# Patient Record
Sex: FEMALE | Race: WHITE | NOT HISPANIC OR LATINO | Employment: UNEMPLOYED | URBAN - METROPOLITAN AREA
[De-identification: names, ages, dates, MRNs, and addresses within clinical notes are randomized per-mention and may not be internally consistent; named-entity substitution may affect disease eponyms.]

---

## 2017-03-08 PROBLEM — L03.115 CELLULITIS OF RIGHT LOWER EXTREMITY: Status: ACTIVE | Noted: 2017-03-08

## 2017-03-08 PROBLEM — N12 PYELONEPHRITIS: Status: ACTIVE | Noted: 2017-03-08

## 2017-03-08 PROBLEM — R00.0 TACHYCARDIA: Status: ACTIVE | Noted: 2017-03-08

## 2017-03-08 PROBLEM — R50.9 FEVER: Status: ACTIVE | Noted: 2017-03-08

## 2017-03-22 PROBLEM — N12 PYELONEPHRITIS: Status: RESOLVED | Noted: 2017-03-08 | Resolved: 2017-03-22

## 2017-03-22 PROBLEM — R00.0 TACHYCARDIA: Status: RESOLVED | Noted: 2017-03-08 | Resolved: 2017-03-22

## 2017-03-22 PROBLEM — R00.0 TACHYCARDIA: Status: ACTIVE | Noted: 2017-03-22

## 2017-03-22 PROBLEM — R50.9 FEVER: Status: RESOLVED | Noted: 2017-03-08 | Resolved: 2017-03-22

## 2017-03-22 PROBLEM — L03.115 CELLULITIS OF RIGHT LOWER EXTREMITY: Status: RESOLVED | Noted: 2017-03-08 | Resolved: 2017-03-22

## 2017-03-22 PROBLEM — G43.909 MIGRAINE WITHOUT STATUS MIGRAINOSUS, NOT INTRACTABLE: Status: ACTIVE | Noted: 2017-03-22

## 2018-06-22 PROBLEM — Z34.90 PREGNANCY: Status: ACTIVE | Noted: 2018-06-22

## 2018-06-23 PROBLEM — O47.9 IRREGULAR UTERINE CONTRACTIONS: Status: ACTIVE | Noted: 2018-06-23

## 2018-06-28 PROBLEM — O47.9 UTERINE CONTRACTIONS DURING PREGNANCY: Status: ACTIVE | Noted: 2018-06-28

## 2018-07-09 PROBLEM — Z37.9 NORMAL LABOR: Status: ACTIVE | Noted: 2018-07-09

## 2018-10-15 PROBLEM — Z37.9 NORMAL LABOR: Status: RESOLVED | Noted: 2018-07-09 | Resolved: 2018-10-15

## 2019-03-12 PROBLEM — K29.00 ACUTE GASTRITIS WITHOUT HEMORRHAGE: Status: ACTIVE | Noted: 2019-03-12

## 2019-03-12 PROBLEM — R11.0 NAUSEA WITHOUT VOMITING: Status: ACTIVE | Noted: 2019-03-12

## 2019-03-12 PROBLEM — K21.9 GASTROESOPHAGEAL REFLUX DISEASE: Status: ACTIVE | Noted: 2019-03-12

## 2021-07-01 ENCOUNTER — PATIENT MESSAGE (OUTPATIENT)
Dept: ADMINISTRATIVE | Facility: OTHER | Age: 31
End: 2021-07-01

## 2021-07-22 ENCOUNTER — OFFICE VISIT (OUTPATIENT)
Dept: ORTHOPEDICS | Facility: CLINIC | Age: 31
End: 2021-07-22
Payer: MEDICAID

## 2021-07-22 VITALS
DIASTOLIC BLOOD PRESSURE: 80 MMHG | HEIGHT: 62 IN | SYSTOLIC BLOOD PRESSURE: 124 MMHG | BODY MASS INDEX: 39.68 KG/M2 | WEIGHT: 215.63 LBS | RESPIRATION RATE: 16 BRPM | HEART RATE: 127 BPM | OXYGEN SATURATION: 98 %

## 2021-07-22 DIAGNOSIS — G56.23 CUBITAL TUNNEL SYNDROME, BILATERAL: ICD-10-CM

## 2021-07-22 DIAGNOSIS — G89.29 CHRONIC PAIN OF LEFT KNEE: Primary | ICD-10-CM

## 2021-07-22 DIAGNOSIS — M25.522 BILATERAL ELBOW JOINT PAIN: ICD-10-CM

## 2021-07-22 DIAGNOSIS — M54.12 CERVICAL RADICULOPATHY: ICD-10-CM

## 2021-07-22 DIAGNOSIS — M25.521 BILATERAL ELBOW JOINT PAIN: ICD-10-CM

## 2021-07-22 DIAGNOSIS — M25.562 CHRONIC PAIN OF LEFT KNEE: Primary | ICD-10-CM

## 2021-07-22 PROCEDURE — 99999 PR PBB SHADOW E&M-EST. PATIENT-LVL V: ICD-10-PCS | Mod: PBBFAC,,, | Performed by: PHYSICIAN ASSISTANT

## 2021-07-22 PROCEDURE — 99215 OFFICE O/P EST HI 40 MIN: CPT | Mod: PBBFAC | Performed by: PHYSICIAN ASSISTANT

## 2021-07-22 PROCEDURE — 99999 PR PBB SHADOW E&M-EST. PATIENT-LVL V: CPT | Mod: PBBFAC,,, | Performed by: PHYSICIAN ASSISTANT

## 2021-07-22 PROCEDURE — 99204 PR OFFICE/OUTPT VISIT, NEW, LEVL IV, 45-59 MIN: ICD-10-PCS | Mod: S$PBB,,, | Performed by: PHYSICIAN ASSISTANT

## 2021-07-22 PROCEDURE — 99204 OFFICE O/P NEW MOD 45 MIN: CPT | Mod: S$PBB,,, | Performed by: PHYSICIAN ASSISTANT

## 2021-07-22 RX ORDER — FAMOTIDINE 40 MG/1
40 TABLET, FILM COATED ORAL DAILY
COMMUNITY
Start: 2021-06-22 | End: 2021-10-04

## 2021-07-22 RX ORDER — MELOXICAM 15 MG/1
15 TABLET ORAL DAILY PRN
Status: ON HOLD | COMMUNITY
Start: 2021-07-13 | End: 2023-08-17 | Stop reason: HOSPADM

## 2021-07-22 RX ORDER — NORETHINDRONE ACETATE AND ETHINYL ESTRADIOL AND FERROUS FUMARATE 1MG-20(21)
1 KIT ORAL DAILY
Status: ON HOLD | COMMUNITY
Start: 2021-07-05 | End: 2023-08-17 | Stop reason: HOSPADM

## 2021-07-22 RX ORDER — CYCLOBENZAPRINE HCL 5 MG
TABLET ORAL
Status: ON HOLD | COMMUNITY
Start: 2021-07-12 | End: 2023-08-17 | Stop reason: HOSPADM

## 2021-07-22 RX ORDER — ALBUTEROL SULFATE 90 UG/1
AEROSOL, METERED RESPIRATORY (INHALATION)
COMMUNITY
Start: 2021-07-06

## 2021-07-22 RX ORDER — HYDROXYZINE HYDROCHLORIDE 25 MG/1
25 TABLET, FILM COATED ORAL EVERY 6 HOURS PRN
COMMUNITY
Start: 2021-06-18 | End: 2021-10-04

## 2021-10-04 ENCOUNTER — OFFICE VISIT (OUTPATIENT)
Dept: ORTHOPEDICS | Facility: CLINIC | Age: 31
End: 2021-10-04
Payer: MEDICAID

## 2021-10-04 VITALS
WEIGHT: 220.88 LBS | RESPIRATION RATE: 20 BRPM | DIASTOLIC BLOOD PRESSURE: 82 MMHG | BODY MASS INDEX: 40.65 KG/M2 | OXYGEN SATURATION: 98 % | HEIGHT: 62 IN | SYSTOLIC BLOOD PRESSURE: 130 MMHG | HEART RATE: 98 BPM

## 2021-10-04 DIAGNOSIS — M25.569 KNEE PAIN, UNSPECIFIED CHRONICITY, UNSPECIFIED LATERALITY: ICD-10-CM

## 2021-10-04 PROCEDURE — 99999 PR PBB SHADOW E&M-EST. PATIENT-LVL IV: CPT | Mod: PBBFAC,,, | Performed by: PHYSICIAN ASSISTANT

## 2021-10-04 PROCEDURE — 99999 PR PBB SHADOW E&M-EST. PATIENT-LVL IV: ICD-10-PCS | Mod: PBBFAC,,, | Performed by: PHYSICIAN ASSISTANT

## 2021-10-04 PROCEDURE — 99213 PR OFFICE/OUTPT VISIT, EST, LEVL III, 20-29 MIN: ICD-10-PCS | Mod: S$PBB,,, | Performed by: PHYSICIAN ASSISTANT

## 2021-10-04 PROCEDURE — 99214 OFFICE O/P EST MOD 30 MIN: CPT | Mod: PBBFAC | Performed by: PHYSICIAN ASSISTANT

## 2021-10-04 PROCEDURE — 99213 OFFICE O/P EST LOW 20 MIN: CPT | Mod: S$PBB,,, | Performed by: PHYSICIAN ASSISTANT

## 2021-10-04 RX ORDER — LORATADINE 10 MG/1
10 TABLET ORAL DAILY
COMMUNITY
Start: 2021-09-27

## 2021-10-04 RX ORDER — DOXYCYCLINE 100 MG/1
CAPSULE ORAL
Status: ON HOLD | COMMUNITY
Start: 2021-07-16 | End: 2023-08-17 | Stop reason: HOSPADM

## 2021-10-04 RX ORDER — SUMATRIPTAN 50 MG/1
TABLET, FILM COATED ORAL
Status: ON HOLD | COMMUNITY
Start: 2021-07-12 | End: 2023-08-17 | Stop reason: HOSPADM

## 2021-10-04 RX ORDER — METOPROLOL SUCCINATE 25 MG/1
25 TABLET, EXTENDED RELEASE ORAL DAILY
Status: ON HOLD | COMMUNITY
Start: 2021-09-27 | End: 2023-08-17 | Stop reason: HOSPADM

## 2021-10-04 RX ORDER — TOPIRAMATE 50 MG/1
50 TABLET, FILM COATED ORAL 2 TIMES DAILY
COMMUNITY
Start: 2021-07-17 | End: 2022-11-29

## 2021-10-04 RX ORDER — OMEPRAZOLE 40 MG/1
40 CAPSULE, DELAYED RELEASE ORAL DAILY
COMMUNITY
Start: 2021-09-24 | End: 2022-06-15

## 2021-10-12 ENCOUNTER — TELEPHONE (OUTPATIENT)
Dept: ORTHOPEDICS | Facility: CLINIC | Age: 31
End: 2021-10-12

## 2021-10-20 ENCOUNTER — HOSPITAL ENCOUNTER (OUTPATIENT)
Dept: RADIOLOGY | Facility: HOSPITAL | Age: 31
Discharge: HOME | End: 2021-10-20
Attending: PHYSICIAN ASSISTANT
Payer: MEDICAID

## 2021-10-20 DIAGNOSIS — M25.569 KNEE PAIN, UNSPECIFIED CHRONICITY, UNSPECIFIED LATERALITY: ICD-10-CM

## 2021-10-20 PROCEDURE — 73721 MRI KNEE WITHOUT CONTRAST LEFT: ICD-10-PCS | Mod: 26,LT,, | Performed by: RADIOLOGY

## 2021-10-20 PROCEDURE — 73721 MRI JNT OF LWR EXTRE W/O DYE: CPT | Mod: TC,LT

## 2021-10-20 PROCEDURE — 73721 MRI JNT OF LWR EXTRE W/O DYE: CPT | Mod: 26,LT,, | Performed by: RADIOLOGY

## 2021-10-21 ENCOUNTER — OFFICE VISIT (OUTPATIENT)
Dept: ORTHOPEDICS | Facility: CLINIC | Age: 31
End: 2021-10-21
Payer: MEDICAID

## 2021-10-21 VITALS
SYSTOLIC BLOOD PRESSURE: 118 MMHG | HEIGHT: 62 IN | HEART RATE: 105 BPM | DIASTOLIC BLOOD PRESSURE: 84 MMHG | RESPIRATION RATE: 20 BRPM | WEIGHT: 216.94 LBS | OXYGEN SATURATION: 99 % | BODY MASS INDEX: 39.92 KG/M2

## 2021-10-21 DIAGNOSIS — G89.29 CHRONIC PAIN OF LEFT KNEE: Primary | ICD-10-CM

## 2021-10-21 DIAGNOSIS — M25.562 CHRONIC PAIN OF LEFT KNEE: Primary | ICD-10-CM

## 2021-10-21 PROCEDURE — 99214 OFFICE O/P EST MOD 30 MIN: CPT | Mod: PBBFAC | Performed by: PHYSICIAN ASSISTANT

## 2021-10-21 PROCEDURE — 99999 PR PBB SHADOW E&M-EST. PATIENT-LVL IV: ICD-10-PCS | Mod: PBBFAC,,, | Performed by: PHYSICIAN ASSISTANT

## 2021-10-21 PROCEDURE — 99213 OFFICE O/P EST LOW 20 MIN: CPT | Mod: S$PBB,,, | Performed by: PHYSICIAN ASSISTANT

## 2021-10-21 PROCEDURE — 99213 PR OFFICE/OUTPT VISIT, EST, LEVL III, 20-29 MIN: ICD-10-PCS | Mod: S$PBB,,, | Performed by: PHYSICIAN ASSISTANT

## 2021-10-21 PROCEDURE — 99999 PR PBB SHADOW E&M-EST. PATIENT-LVL IV: CPT | Mod: PBBFAC,,, | Performed by: PHYSICIAN ASSISTANT

## 2022-09-29 ENCOUNTER — OFFICE VISIT (OUTPATIENT)
Dept: ORTHOPEDICS | Facility: CLINIC | Age: 32
End: 2022-09-29
Payer: MEDICAID

## 2022-09-29 VITALS
RESPIRATION RATE: 18 BRPM | BODY MASS INDEX: 33.68 KG/M2 | DIASTOLIC BLOOD PRESSURE: 88 MMHG | WEIGHT: 183 LBS | HEART RATE: 105 BPM | SYSTOLIC BLOOD PRESSURE: 130 MMHG | HEIGHT: 62 IN

## 2022-09-29 DIAGNOSIS — G89.29 CHRONIC PAIN OF LEFT KNEE: Primary | ICD-10-CM

## 2022-09-29 DIAGNOSIS — M25.562 CHRONIC PAIN OF LEFT KNEE: Primary | ICD-10-CM

## 2022-09-29 PROCEDURE — 3075F SYST BP GE 130 - 139MM HG: CPT | Mod: CPTII,,, | Performed by: PHYSICIAN ASSISTANT

## 2022-09-29 PROCEDURE — 3079F DIAST BP 80-89 MM HG: CPT | Mod: CPTII,,, | Performed by: PHYSICIAN ASSISTANT

## 2022-09-29 PROCEDURE — 99999 PR PBB SHADOW E&M-EST. PATIENT-LVL V: CPT | Mod: PBBFAC,,, | Performed by: PHYSICIAN ASSISTANT

## 2022-09-29 PROCEDURE — 99215 OFFICE O/P EST HI 40 MIN: CPT | Mod: PBBFAC | Performed by: PHYSICIAN ASSISTANT

## 2022-09-29 PROCEDURE — 99999 PR PBB SHADOW E&M-EST. PATIENT-LVL V: ICD-10-PCS | Mod: PBBFAC,,, | Performed by: PHYSICIAN ASSISTANT

## 2022-09-29 PROCEDURE — 1159F MED LIST DOCD IN RCRD: CPT | Mod: CPTII,,, | Performed by: PHYSICIAN ASSISTANT

## 2022-09-29 PROCEDURE — 99213 OFFICE O/P EST LOW 20 MIN: CPT | Mod: S$PBB,,, | Performed by: PHYSICIAN ASSISTANT

## 2022-09-29 PROCEDURE — 3079F PR MOST RECENT DIASTOLIC BLOOD PRESSURE 80-89 MM HG: ICD-10-PCS | Mod: CPTII,,, | Performed by: PHYSICIAN ASSISTANT

## 2022-09-29 PROCEDURE — 3008F PR BODY MASS INDEX (BMI) DOCUMENTED: ICD-10-PCS | Mod: CPTII,,, | Performed by: PHYSICIAN ASSISTANT

## 2022-09-29 PROCEDURE — 1159F PR MEDICATION LIST DOCUMENTED IN MEDICAL RECORD: ICD-10-PCS | Mod: CPTII,,, | Performed by: PHYSICIAN ASSISTANT

## 2022-09-29 PROCEDURE — 99213 PR OFFICE/OUTPT VISIT, EST, LEVL III, 20-29 MIN: ICD-10-PCS | Mod: S$PBB,,, | Performed by: PHYSICIAN ASSISTANT

## 2022-09-29 PROCEDURE — 3075F PR MOST RECENT SYSTOLIC BLOOD PRESS GE 130-139MM HG: ICD-10-PCS | Mod: CPTII,,, | Performed by: PHYSICIAN ASSISTANT

## 2022-09-29 PROCEDURE — 1160F RVW MEDS BY RX/DR IN RCRD: CPT | Mod: CPTII,,, | Performed by: PHYSICIAN ASSISTANT

## 2022-09-29 PROCEDURE — 1160F PR REVIEW ALL MEDS BY PRESCRIBER/CLIN PHARMACIST DOCUMENTED: ICD-10-PCS | Mod: CPTII,,, | Performed by: PHYSICIAN ASSISTANT

## 2022-09-29 PROCEDURE — 3008F BODY MASS INDEX DOCD: CPT | Mod: CPTII,,, | Performed by: PHYSICIAN ASSISTANT

## 2022-09-29 RX ORDER — TRIAMCINOLONE ACETONIDE 40 MG/ML
40 INJECTION, SUSPENSION INTRA-ARTICULAR; INTRAMUSCULAR ONCE
Status: COMPLETED | OUTPATIENT
Start: 2022-09-29 | End: 2022-09-29

## 2022-09-29 RX ORDER — DOXYCYCLINE 100 MG/1
1 TABLET ORAL DAILY
Status: ON HOLD | COMMUNITY
Start: 2022-09-14 | End: 2023-08-17 | Stop reason: HOSPADM

## 2022-09-29 RX ORDER — DM/P-EPHED/ACETAMINOPH/DOXYLAM 30-7.5/3
2 LIQUID (ML) ORAL
COMMUNITY

## 2022-09-29 RX ORDER — VIT C/E/ZN/COPPR/LUTEIN/ZEAXAN 250MG-90MG
1 CAPSULE ORAL
Status: ON HOLD | COMMUNITY
End: 2023-08-17 | Stop reason: HOSPADM

## 2022-09-29 RX ADMIN — TRIAMCINOLONE ACETONIDE 40 MG: 40 INJECTION, SUSPENSION INTRA-ARTICULAR; INTRAMUSCULAR at 02:09

## 2022-09-29 NOTE — PROGRESS NOTES
Subjective:      Patient ID: Alexandria Saleh is a 32 y.o. female.    Chief Complaint: Pain of the Left Knee    Review of patient's allergies indicates:   Allergen Reactions    Peas Hives     Sweet peas    Tramadol         Pt returns to clinic with c/o left knee pain.  No recent injury/trauma.  She is asking for knee injection today.  She would also like to attend PT at [facility], states that she was not able to get appointment previously.  She denies any catching or locking but states that knee still gives out at times.    10/21/21:  Patient returns for follow up of left knee pain and MRI results.  She reports some improvement in sx since last visit.    10/4/21:  Patient returns to clinic for follow up of bilateral elbow pain and left knee pain.    She was referred to [facility] orthopedics at last visit for further treatment of cubital tunnel syndrome but has not been able to schedule appointment yet.    Left knee pain is better when she wears brace but still hurts and gives out when not wearing brace.  She has been completing home exercises without much relief.      7/22/21:    30 yo RHD F presents to clinic with c/o bilateral elbow pain/finger numbness and left knee pain.    Bilateral elbow pain x few months.  Denies any injury or trauma.  Intermittent numbness/tingling to bilateral ring and small fingers.  Numbness is worse when she leans on elbow.  She has seen neurology who ordered EMG that showed cervical radiculopathy and bilateral cubital tunnel syndrome.  She was prescribed Mobic which has not improved these symptoms much.    Left knee pain started about 8 months ago in December 2020 when she fell while walking at home on concrete, landed on left knee.  Pain is to medial and posterior knee, described as sharp/achy, worse with bending knee and walking and better with rest and Mobic.  Knee gives out occasionally, no locking or catching.  No swelling.  No radiation of pain.  No numbness or tingling of lower  extremities.      Review of Systems   Constitutional: Negative for chills, diaphoresis and fever.   HENT: Negative for congestion, ear discharge and ear pain.    Eyes: Negative for blurred vision, discharge, double vision and pain.   Cardiovascular: Negative for chest pain, claudication and cyanosis.   Respiratory: Negative for cough, hemoptysis and shortness of breath.    Endocrine: Negative for cold intolerance and heat intolerance.   Skin: Negative for color change, dry skin, itching and rash.   Musculoskeletal: Positive for back pain, joint pain and neck pain. Negative for arthritis, falls, gout, joint swelling and muscle weakness.   Gastrointestinal: Negative for abdominal pain and change in bowel habit.   Neurological: Positive for numbness and paresthesias. Negative for brief paralysis, disturbances in coordination and dizziness.   Psychiatric/Behavioral: Negative for altered mental status and depression.         Objective:          General    Constitutional: She is oriented to person, place, and time. She appears well-developed and well-nourished. No distress.   HENT:   Head: Atraumatic.   Eyes: EOM are normal. Right eye exhibits no discharge. Left eye exhibits no discharge.   Cardiovascular: Normal rate.    Pulmonary/Chest: Effort normal. No respiratory distress.   Abdominal: Soft.   Neurological: She is alert and oriented to person, place, and time.   Psychiatric: She has a normal mood and affect. Her behavior is normal.           Right Knee Exam     Inspection   Erythema: absent  Scars: absent  Swelling: absent  Effusion: absent  Deformity: absent  Bruising: absent    Range of Motion   Extension: 0   Flexion: 140     Tests   Meniscus   Ambreen:  Medial - negative Lateral - negative  Ligament Examination Lachman: normal (-1 to 2mm) PCL-Posterior Drawer: normal (0 to 2mm)     MCL - Valgus: normal (0 to 2mm)  LCL - Varus: normal    Other   Sensation: normal    Left Knee Exam     Inspection   Erythema:  absent  Scars: absent  Swelling: absent  Effusion: absent  Deformity: absent  Bruising: absent    Tenderness   The patient tender to palpation of the medial joint line.    Range of Motion   Extension: 0   Flexion: 140     Tests   Meniscus   Ambreen:  Medial - positive (positive for pain) Lateral - negative  Stability Lachman: normal (-1 to 2mm) PCL-Posterior Drawer: normal (0 to 2mm)  MCL - Valgus: normal (0 to 2mm)  LCL - Varus: normal (0 to 2mm)    Other   Sensation: normal    Comments:  No erythema or warmth            Muscle Strength   Right Lower Extremity   Hip Abduction: 5/5   Quadriceps:  5/5   Hamstrin/5   Left Lower Extremity   Hip Abduction: 5/5   Quadriceps:  5/5   Hamstrin/5     Vascular Exam     Right Pulses  Dorsalis Pedis:      2+    Radial:                    2+      Left Pulses  Dorsalis Pedis:      2+    Radial:                    2+      Capillary Refill  Right Hand: normal capillary refill  Left Hand: normal capillary refill    Edema  Right Lower Leg: absent  Left Lower Leg: absent              Assessment:         MRI Left Knee 10/20/21:  Normal MRI examination of the right knee.       Xray Left Knee 21:  No abnormality appreciated left knee.    EMG Bilateral Upper Extremities 21:  This study revealed bilateral C5 & C6 radicular disease as well as bilateral ulnar neuropathies at or about both elbows.    Encounter Diagnosis   Name Primary?    Chronic pain of left knee Yes    Chronic pain of left knee  -     triamcinolone acetonide injection 40 mg             Plan:         I made the decision to obtain old records of the patient including previous notes and imaging. New imaging was ordered today of the extremity or extremities evaluated. I independently reviewed and interpreted the radiographs and/or MRIs today as well as prior imaging.    The total face-to-face encounter time with this patient was 45 minutes and greater than 50% of of the encounter time was spent counseling  the patient, coordinating care, and education regarding the pathology and natural history of his diagnosis. We have discussed a variety of treatment options including medications, injections, physical therapy and other alternative treatments.Pt would like to proceed with left knee CSI as well as attend PT at [facility].      1. Injection Procedure  A time out was performed, including verification of patient ID, procedure, site and side, availability of information and equipment, review of safety issues, and agreement with consent, the procedure site was marked.    After time out was performed, the patient was prepped aseptically with chloraprep swabsticks. A diagnostic and therapeutic injection of 1:3cc Kenalog/Marcaine was given under sterile technique using a 22g x 1.5 needle from the Anterolateral aspect of the left Knee Joint in the sitting position.      Alexandria Saleh had no adverse reactions to the medication. Pain decreased. She was instructed to apply ice to the joint for 20 minutes and avoid strenuous activities for 24-36 hours following the injection. She was warned of possible blood sugar and/or blood pressure changes during that time. Following that time, she can resume regular activities.    She was reminded to call the clinic immediately for any adverse side effects as explained in clinic today.       2. Continue Mobic 15 mg 1 time daily as prescribed as needed for pain management.   3. Ambulatory referral to physical therapy for patellofemoral strengthening and conditioning. Pt would like referral sent to [facility].  4. Ice compress to the affected area 2-3x a day for 15-20 minutes as needed for pain management.  5. RTC after completion of  PT. Pt instructed to call clinic for appointment after discharged from PT.      Patient voices understanding of and agreement with treatment plan. All of the patient's questions were answered and the patient will contact us if she has any questions or concerns in  the interim.

## 2022-11-29 ENCOUNTER — OFFICE VISIT (OUTPATIENT)
Dept: ORTHOPEDICS | Facility: CLINIC | Age: 32
End: 2022-11-29
Payer: MEDICAID

## 2022-11-29 VITALS — HEIGHT: 62 IN | WEIGHT: 187.38 LBS | BODY MASS INDEX: 34.48 KG/M2

## 2022-11-29 DIAGNOSIS — G89.29 CHRONIC PAIN OF LEFT KNEE: Primary | ICD-10-CM

## 2022-11-29 DIAGNOSIS — M25.562 CHRONIC PAIN OF LEFT KNEE: Primary | ICD-10-CM

## 2022-11-29 PROCEDURE — 99214 OFFICE O/P EST MOD 30 MIN: CPT | Mod: PBBFAC | Performed by: PHYSICIAN ASSISTANT

## 2022-11-29 PROCEDURE — 99213 PR OFFICE/OUTPT VISIT, EST, LEVL III, 20-29 MIN: ICD-10-PCS | Mod: S$PBB,,, | Performed by: PHYSICIAN ASSISTANT

## 2022-11-29 PROCEDURE — 99999 PR PBB SHADOW E&M-EST. PATIENT-LVL IV: CPT | Mod: PBBFAC,,, | Performed by: PHYSICIAN ASSISTANT

## 2022-11-29 PROCEDURE — 3008F PR BODY MASS INDEX (BMI) DOCUMENTED: ICD-10-PCS | Mod: CPTII,,, | Performed by: PHYSICIAN ASSISTANT

## 2022-11-29 PROCEDURE — 1160F PR REVIEW ALL MEDS BY PRESCRIBER/CLIN PHARMACIST DOCUMENTED: ICD-10-PCS | Mod: CPTII,,, | Performed by: PHYSICIAN ASSISTANT

## 2022-11-29 PROCEDURE — 99213 OFFICE O/P EST LOW 20 MIN: CPT | Mod: S$PBB,,, | Performed by: PHYSICIAN ASSISTANT

## 2022-11-29 PROCEDURE — 1160F RVW MEDS BY RX/DR IN RCRD: CPT | Mod: CPTII,,, | Performed by: PHYSICIAN ASSISTANT

## 2022-11-29 PROCEDURE — 1159F MED LIST DOCD IN RCRD: CPT | Mod: CPTII,,, | Performed by: PHYSICIAN ASSISTANT

## 2022-11-29 PROCEDURE — 1159F PR MEDICATION LIST DOCUMENTED IN MEDICAL RECORD: ICD-10-PCS | Mod: CPTII,,, | Performed by: PHYSICIAN ASSISTANT

## 2022-11-29 PROCEDURE — 99999 PR PBB SHADOW E&M-EST. PATIENT-LVL IV: ICD-10-PCS | Mod: PBBFAC,,, | Performed by: PHYSICIAN ASSISTANT

## 2022-11-29 PROCEDURE — 3008F BODY MASS INDEX DOCD: CPT | Mod: CPTII,,, | Performed by: PHYSICIAN ASSISTANT

## 2022-11-29 RX ORDER — CLINDAMYCIN PHOSPHATE 11.9 MG/ML
SOLUTION TOPICAL 2 TIMES DAILY
Status: ON HOLD | COMMUNITY
Start: 2022-10-21 | End: 2023-08-17 | Stop reason: HOSPADM

## 2022-11-29 RX ORDER — TOPIRAMATE 100 MG/1
200 TABLET, FILM COATED ORAL NIGHTLY
Status: ON HOLD | COMMUNITY
Start: 2022-11-20 | End: 2023-08-17 | Stop reason: HOSPADM

## 2022-11-29 NOTE — PROGRESS NOTES
Subjective:      Patient ID: Alexandria Saleh is a 32 y.o. female.    Chief Complaint: Pain of the Left Knee    Review of patient's allergies indicates:   Allergen Reactions    Peas Hives     Sweet peas    Tramadol         Pt returns to clinic for follow up of left knee pain.  She reports no change in sx.  CSI at last visit provided minimal relief.  She has not been able to start PT, says that when she calls [facility] there is a busy signal.  She is still interested in PT.    9/29/22:  Pt returns to clinic with c/o left knee pain.  No recent injury/trauma.  She is asking for knee injection today.  She would also like to attend PT at [facility], states that she was not able to get appointment previously.  She denies any catching or locking but states that knee still gives out at times.    10/21/21:  Patient returns for follow up of left knee pain and MRI results.  She reports some improvement in sx since last visit.    10/4/21:  Patient returns to clinic for follow up of bilateral elbow pain and left knee pain.    She was referred to [facility] at last visit for further treatment of cubital tunnel syndrome but has not been able to schedule appointment yet.    Left knee pain is better when she wears brace but still hurts and gives out when not wearing brace.  She has been completing home exercises without much relief.      7/22/21:    30 yo ROSALIA ENGLISH presents to clinic with c/o bilateral elbow pain/finger numbness and left knee pain.    Bilateral elbow pain x few months.  Denies any injury or trauma.  Intermittent numbness/tingling to bilateral ring and small fingers.  Numbness is worse when she leans on elbow.  She has seen neurology who ordered EMG that showed cervical radiculopathy and bilateral cubital tunnel syndrome.  She was prescribed Mobic which has not improved these symptoms much.    Left knee pain started about 8 months ago in December 2020 when she fell while walking at home on concrete, landed on  left knee.  Pain is to medial and posterior knee, described as sharp/achy, worse with bending knee and walking and better with rest and Mobic.  Knee gives out occasionally, no locking or catching.  No swelling.  No radiation of pain.  No numbness or tingling of lower extremities.      Review of Systems   Constitutional: Negative for chills, diaphoresis and fever.   HENT: Negative for congestion, ear discharge and ear pain.    Eyes: Negative for blurred vision, discharge, double vision and pain.   Cardiovascular: Negative for chest pain, claudication and cyanosis.   Respiratory: Negative for cough, hemoptysis and shortness of breath.    Endocrine: Negative for cold intolerance and heat intolerance.   Skin: Negative for color change, dry skin, itching and rash.   Musculoskeletal: Positive for back pain, joint pain and neck pain. Negative for arthritis, falls, gout, joint swelling and muscle weakness.   Gastrointestinal: Negative for abdominal pain and change in bowel habit.   Neurological: Positive for numbness and paresthesias. Negative for brief paralysis, disturbances in coordination and dizziness.   Psychiatric/Behavioral: Negative for altered mental status and depression.         Objective:          General    Constitutional: She is oriented to person, place, and time. She appears well-developed and well-nourished. No distress.   HENT:   Head: Atraumatic.   Eyes: EOM are normal. Right eye exhibits no discharge. Left eye exhibits no discharge.   Cardiovascular: Normal rate.    Pulmonary/Chest: Effort normal. No respiratory distress.   Abdominal: Soft.   Neurological: She is alert and oriented to person, place, and time.   Psychiatric: She has a normal mood and affect. Her behavior is normal.           Right Knee Exam     Inspection   Erythema: absent  Scars: absent  Swelling: absent  Effusion: absent  Deformity: absent  Bruising: absent    Range of Motion   Extension: 0   Flexion: 140     Tests   Meniscus    Ambreen:  Medial - negative Lateral - negative  Ligament Examination Lachman: normal (-1 to 2mm) PCL-Posterior Drawer: normal (0 to 2mm)     MCL - Valgus: normal (0 to 2mm)  LCL - Varus: normal    Other   Sensation: normal    Left Knee Exam     Inspection   Erythema: absent  Scars: absent  Swelling: absent  Effusion: absent  Deformity: absent  Bruising: absent    Tenderness   The patient tender to palpation of the medial joint line.    Range of Motion   Extension: 0   Flexion: 140     Tests   Meniscus   Ambreen:  Medial - positive (positive for pain) Lateral - negative  Stability Lachman: normal (-1 to 2mm) PCL-Posterior Drawer: normal (0 to 2mm)  MCL - Valgus: normal (0 to 2mm)  LCL - Varus: normal (0 to 2mm)    Other   Sensation: normal    Comments:  No erythema or warmth            Muscle Strength   Right Lower Extremity   Hip Abduction: 5/5   Quadriceps:  5/5   Hamstrin/5   Left Lower Extremity   Hip Abduction: 5/5   Quadriceps:  5/5   Hamstrin/5     Vascular Exam     Right Pulses  Dorsalis Pedis:      2+    Radial:                    2+      Left Pulses  Dorsalis Pedis:      2+    Radial:                    2+      Capillary Refill  Right Hand: normal capillary refill  Left Hand: normal capillary refill    Edema  Right Lower Leg: absent  Left Lower Leg: absent              Assessment:         MRI Left Knee 10/20/21:  Normal MRI examination of the right knee.       Xray Left Knee 21:  No abnormality appreciated left knee.    EMG Bilateral Upper Extremities 21:  This study revealed bilateral C5 & C6 radicular disease as well as bilateral ulnar neuropathies at or about both elbows.    Encounter Diagnosis   Name Primary?    Chronic pain of left knee Yes    Chronic pain of left knee  -     Ambulatory referral/consult to Physical/Occupational Therapy; Future; Expected date: 2022             Plan:         I made the decision to obtain old records of the patient including previous notes and  imaging. New imaging was ordered today of the extremity or extremities evaluated. I independently reviewed and interpreted the radiographs and/or MRIs today as well as prior imaging.    The total face-to-face encounter time with this patient was 30 minutes and greater than 50% of of the encounter time was spent counseling the patient, coordinating care, and education regarding the pathology and natural history of his diagnosis. We have discussed a variety of treatment options including medications, injections, physical therapy and other alternative treatments.Pt would like to try PT, would like referral sent to [facility].      1. Continue Mobic 15 mg 1 time daily as prescribed as needed for pain management.   2. Ambulatory referral to physical therapy for patellofemoral strengthening and conditioning. Pt would like referral sent to [facility].  3. Ice compress to the affected area 2-3x a day for 15-20 minutes as needed for pain management.  4. RTC after completion of  PT. Pt instructed to call clinic for appointment after discharged from PT, sooner if sx continue or worse or if any new s/s.      Patient voices understanding of and agreement with treatment plan. All of the patient's questions were answered and the patient will contact us if she has any questions or concerns in the interim.

## 2022-12-23 ENCOUNTER — CLINICAL SUPPORT (OUTPATIENT)
Dept: REHABILITATION | Facility: HOSPITAL | Age: 32
End: 2022-12-23
Payer: MEDICAID

## 2022-12-23 DIAGNOSIS — M25.562 CHRONIC PAIN OF LEFT KNEE: ICD-10-CM

## 2022-12-23 DIAGNOSIS — G89.29 CHRONIC PAIN OF LEFT KNEE: ICD-10-CM

## 2022-12-23 PROCEDURE — 97110 THERAPEUTIC EXERCISES: CPT | Mod: PN

## 2022-12-23 PROCEDURE — 97161 PT EVAL LOW COMPLEX 20 MIN: CPT | Mod: PN
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
## 2022-12-27 ENCOUNTER — CLINICAL SUPPORT (OUTPATIENT)
Dept: REHABILITATION | Facility: HOSPITAL | Age: 32
End: 2022-12-27
Payer: MEDICAID

## 2022-12-27 DIAGNOSIS — M25.562 CHRONIC PAIN OF LEFT KNEE: Primary | ICD-10-CM

## 2022-12-27 DIAGNOSIS — G89.29 CHRONIC PAIN OF LEFT KNEE: Primary | ICD-10-CM

## 2022-12-27 PROCEDURE — 97110 THERAPEUTIC EXERCISES: CPT | Mod: PN

## 2022-12-27 NOTE — PROGRESS NOTES
"[facility]   Physical Therapy Treatment Note     Name: Alexandria Saleh  Clinic Number: [id number]    Therapy Diagnosis:   Encounter Diagnosis   Name Primary?    Chronic pain of left knee Yes     Physician: Farzaneh Mcneil PA-C    Visit Date: 12/27/2022    Physician Orders: PT Eval and Treat   Medical Diagnosis from Referral: M25.562,G89.29 (ICD-10-CM) - Chronic pain of left knee   Evaluation Date: 12/23/2022  Authorization Period Expiration: 2/24/2023  Plan of Care Expiration: 2/24/2023  Progress Note Due: 1/20/2023  Visit # / Visits authorized: 2/ 16   FOTO: 1/3     Precautions: Standard   PTA Visit #: 0/5     Time In: 1:45pm  Time Out: 2:25pm  Total Billable Time: 40 minutes    SUBJECTIVE     Pt reports: left knee feeling a little better today compared to last visit. Patient states she has been performing home exercise program at home and notices it has been helping.  She was compliant with home exercise program.  Response to previous treatment: none reported  Functional change: none reported    Pain: 4/10  Location: left knee      OBJECTIVE     Objective Measures updated at progress report unless specified.     Treatment     Alexandria received the treatments listed below:      therapeutic exercises to develop strength, endurance, ROM, and flexibility for 40 minutes including:  Short arc quad x 20   Supine hamstring stretch with 10 sec hold x 5 on BLE   Gastroc stretch 3x30" holds  Straight leg raise 2x10   Straight leg raise with ER 2x10   Straight leg raise abduction 2x10  Seated knee slides x20  Supine clamshells with RTB 2x10  Ball squeezes 2x10      manual therapy techniques: Joint mobilizations, Myofacial release, and Soft tissue Mobilization were applied to the: left knee for 0 minutes, including:  Not today    neuromuscular re-education activities to improve: Balance, Coordination, and Proprioception for 0 minutes. The following activities were included:  Not today      supervised " modalities after being cleared for contradictions: IFC Electrical Stimulation:  Senari received IFC Electrical Stimulation for pain control applied to the knee. Pt received stimulation at 0 % scan at a frequency of 0 for 0 minutes. Ambri tolderated treatment well without any adverse effects.      hot pack for 0 minutes to knee.    cold pack for 0 minutes to knee.      Patient Education and Home Exercises     Home Exercises Provided and Patient Education Provided     Written Home Exercises Provided: yes. Exercises were reviewed and Senari was able to demonstrate them prior to the end of the session.  Ambri demonstrated good  understanding of the education provided. See EMR under Patient Instructions for exercises provided during therapy sessions    ASSESSMENT     Patient came in today with mild knee pain.  Treatment started with exercises to promote knee range of motion and strength in which patient demonstrated good tolerance with no mild in pain, cueing of form, and slowed execution.  Patient demonstrates good tolerance to short arc quads with modification of using towel under the knee and cueing to apple 50% of strength to avoid pain increase.  Patient demonstrates improved tolerance to hamstring stretch with cueing to keep knee straight and to lift to a height that is pain free. By the end of treatment, patient states mild decrease in pain and noted popping/cracking when trying to stand from sitting.  Will continue to work on strength and functional activities based on patient tolerance.    Alexandria Is progressing towards her goals.   Pt prognosis is Good.     Pt will continue to benefit from skilled outpatient physical therapy to address the deficits listed in the problem list box on initial evaluation, provide pt/family education and to maximize pt's level of independence in the home and community environment.     Pt's spiritual, cultural and educational needs considered and pt agreeable to plan of care and goals.      Anticipated barriers to physical therapy: understanding of current condition, difficulty communicating     Goals:   Short Term Goals: 4 weeks   Patient will be independent with home exercise program for knee strengthening.   Patient will report pain 5/10 during walking for 2 minutes.   Patient will improve impaired lower extremity manual muscle tests to >/= 4/5 to improve dynamic left knee support for functional tasks.  Patient will squat with pain <4/10 in knees.      Long Term Goals: 8 weeks    Patient will improve FOTO score to </= 73% limited to decrease perceived limitation with mobility.   Patient will improve impaired lower extremity manual muscle tests to >/= 4+/5 to improve dynamic left knee support for functional tasks.  Patient will demonstrate proper squatting to pick 10# object from floor without increase in pain     PLAN     Plan of care Certification: 12/23/2022 to 2/24/2023.     Outpatient Physical Therapy 2 times weekly for 8 weeks to include the following interventions: Gait Training, Manual Therapy, Patient Education, Self Care, Therapeutic Activities, and Therapeutic Exercise.      Pt may be seen by PTA as part of the rehabilitation team.       Natalie Mckenzie, PT

## 2022-12-29 ENCOUNTER — CLINICAL SUPPORT (OUTPATIENT)
Dept: REHABILITATION | Facility: HOSPITAL | Age: 32
End: 2022-12-29
Payer: MEDICAID

## 2022-12-29 DIAGNOSIS — G89.29 CHRONIC PAIN OF LEFT KNEE: Primary | ICD-10-CM

## 2022-12-29 DIAGNOSIS — M25.562 CHRONIC PAIN OF LEFT KNEE: Primary | ICD-10-CM

## 2022-12-29 PROCEDURE — 97110 THERAPEUTIC EXERCISES: CPT | Mod: PN,CQ

## 2022-12-29 NOTE — PROGRESS NOTES
[facility]   Physical Therapy Treatment Note     Name: Alexandria Saleh  Clinic Number: [id number]    Therapy Diagnosis:   Encounter Diagnosis   Name Primary?    Chronic pain of left knee Yes     Physician: Farzaneh Mcneil PA-C    Visit Date: 12/29/2022    Physician Orders: PT Eval and Treat   Medical Diagnosis from Referral: M25.562,G89.29 (ICD-10-CM) - Chronic pain of left knee   Evaluation Date: 12/23/2022  Authorization Period Expiration: 2/24/2023  Plan of Care Expiration: 2/24/2023  Progress Note Due: 1/20/2023  Visit # / Visits authorized: 3/10  FOTO: 1/3     Precautions: Standard   PTA Visit #: 1/5     Time In: 1415  Time Out: 1455  Total Billable Time: 40 minutes    SUBJECTIVE     Pt reports: her left knee feels sore and painful today. She has been dealing with this knee pain for a while now. Patient reports she did received a knee injection to Left knee a while back on 9/29/2022. She finds the injection to knee helps for about 3-6 months and the pain resume again. Patient reports she takes muscles relaxer and tylenol for knee pain. Patient also mentioned she soaked her knee in epsom salt for pain relief. Currently despite of knee pain, patient states she has no problem ascending/descending stairs, getting in/out of car, or walking.    She was compliant with home exercise program.  Response to previous treatment: sore from last therapy visit.   Functional change: knee pain that limits her ability to participate in daily routines.     Pain: 6/10 at rest   Location: left knee      OBJECTIVE     Objective Measures updated at progress report unless specified.     Treatment     Alexandria received the treatments listed below:      therapeutic exercises to develop strength, endurance, ROM, and flexibility for 40 minutes including:    Recumbent bike for 1 minutes at Level 1 (attempted, but patient was unable to tolerate this as a warm up due to increase knee pain, therefore, defer this  exercise today).   Quad sets with ball under knee, x10 (very painful with this exercise and only able to complete 10 reps with cueing from therapist to activate quads muscles)   Supine hip ADD ball squeezes, 2x10  Supine hip ABD Red theraband, 2x10 (cues to use Left knee more than right for hip strengthening)    SAQ with large bolster under knee, 2x10 (cues to not fully hyperextends her knee due to increase knee pain with this exercise)   SLR with leg in neutral position, 2x10 (cues for ankle DF, activate quads and lift leg up towards ceiling)   LAQ LLE only, 2x10 (cues for only knee extension, not hip hike)   Standing heel raises with bilateral UE supports, 2x10  Standing marches with bilateral UE supports, 2x10 (cues for hip flexion instead of knee flexion doing a hamstring curls)       manual therapy techniques: Joint mobilizations, Myofacial release, and Soft tissue Mobilization were applied to the: left knee for 0 minutes, including:  Not today    neuromuscular re-education activities to improve: Balance, Coordination, and Proprioception for 0 minutes. The following activities were included:  Not today      supervised modalities after being cleared for contradictions: IFC Electrical Stimulation:  Ambri received IFC Electrical Stimulation for pain control applied to the knee. Pt received stimulation at 0 % scan at a frequency of 0 for 0 minutes. Ambri tolderated treatment well without any adverse effects.      hot pack for 0 minutes to knee.    cold pack for 0 minutes to knee.      Patient Education and Home Exercises     Home Exercises Provided and Patient Education Provided     Written Home Exercises Provided: yes. Exercises were reviewed and Ambri was able to demonstrate them prior to the end of the session.  Ambri demonstrated good  understanding of the education provided. See EMR under Patient Instructions for exercises provided during therapy sessions    ASSESSMENT   Patient ambulating into therapy clinic  "with no A.D. reporting of increase left knee pain today. Initiate session with a warm up on stationary bike for knee strengthening, endurance, and ROM. However, patient was unable to tolerate this exercise due to patient experience knee discomforts and pain. Patient began to get teary eyes while on the bike due to pain, therefore, defer this exercise. Patient required moderate verbal and tactile cues for all exercises for proper techniques and for proper muscles activation. Noted patient began to get emotional and teary eyes again while performing supine exercises. When asked what is wrong, patient states, "it hurts" referring to left knee. She points pain location at medial and lateroposterior aspect of knee. This therapist explained to patient the benefits of therapeutic exercises in order to improve her knee functional mobility and for reduce pain. Printed home exercise program handouts and review with patient. At this time, will progress patient per her tolerance.     Alexandria Is progressing towards her goals.   Pt prognosis is Good.     Pt will continue to benefit from skilled outpatient physical therapy to address the deficits listed in the problem list box on initial evaluation, provide pt/family education and to maximize pt's level of independence in the home and community environment.     Pt's spiritual, cultural and educational needs considered and pt agreeable to plan of care and goals.     Anticipated barriers to physical therapy: understanding of current condition, difficulty communicating     Goals:   Short Term Goals: 4 weeks   Patient will be independent with home exercise program for knee strengthening.   Patient will report pain 5/10 during walking for 2 minutes.   Patient will improve impaired lower extremity manual muscle tests to >/= 4/5 to improve dynamic left knee support for functional tasks.  Patient will squat with pain <4/10 in knees.      Long Term Goals: 8 weeks    Patient will improve FOTO " score to </= 73% limited to decrease perceived limitation with mobility.   Patient will improve impaired lower extremity manual muscle tests to >/= 4+/5 to improve dynamic left knee support for functional tasks.  Patient will demonstrate proper squatting to pick 10# object from floor without increase in pain     PLAN     Plan of care Certification: 12/23/2022 to 2/24/2023.     Outpatient Physical Therapy 2 times weekly for 8 weeks to include the following interventions: Gait Training, Manual Therapy, Patient Education, Self Care, Therapeutic Activities, and Therapeutic Exercise. Pt may be seen by PTA as part of the rehabilitation team.     Continue with plan of care     Jesenia Mosqueda PTA

## 2023-01-03 ENCOUNTER — CLINICAL SUPPORT (OUTPATIENT)
Dept: REHABILITATION | Facility: HOSPITAL | Age: 33
End: 2023-01-03
Attending: PHYSICIAN ASSISTANT
Payer: MEDICAID

## 2023-01-03 DIAGNOSIS — M25.562 CHRONIC PAIN OF LEFT KNEE: Primary | ICD-10-CM

## 2023-01-03 DIAGNOSIS — G89.29 CHRONIC PAIN OF LEFT KNEE: Primary | ICD-10-CM

## 2023-01-03 PROCEDURE — 97110 THERAPEUTIC EXERCISES: CPT | Mod: PN,CQ

## 2023-01-03 NOTE — PROGRESS NOTES
[facility]   Physical Therapy Treatment Note     Name: Alexandria Saleh  Clinic Number: [id number]    Therapy Diagnosis:   Encounter Diagnosis   Name Primary?    Chronic pain of left knee Yes     Physician: Farzaneh Mcneil PA-C    Visit Date: 1/3/2023    Physician Orders: PT Eval and Treat   Medical Diagnosis from Referral: M25.562,G89.29 (ICD-10-CM) - Chronic pain of left knee   Evaluation Date: 12/23/2022  Authorization Period Expiration: 2/24/2023  Plan of Care Expiration: 2/24/2023  Progress Note Due: 1/20/2023  Visit # / Visits authorized: 4/10  FOTO: 1/3     Precautions: Standard   PTA Visit #: 2/5     Time In: 1405 (pt arrives late)   Time Out: 1430   Total Billable Time: 25 minutes    SUBJECTIVE     Pt reports: she did her exercises at home such as standing heel raises and SLR. As of right now, her left knee feels sore, but no major pain. She just order more epsom salt for her knee and is waiting for it to come in.     She was compliant with home exercise program.  Response to previous treatment: still sore from last therapy visit.   Functional change: knee pain that limits her ability to participate in daily routines.     Pain: 6/10 at rest   Location: left knee      OBJECTIVE     Objective Measures updated at progress report unless specified.     Treatment     Alexandria received the treatments listed below:      therapeutic exercises to develop strength, endurance, ROM, and flexibility for 25 minutes including:    Recumbent bike for 1 minutes at Level 1 - deferred today   Quad sets with ball under knee, x10 (very painful with this exercise and only able to complete 10 reps with cueing from therapist to activate quads muscles)   Supine hip ADD ball squeezes, 2x10  Supine hip ABD Red theraband, 2x10 (cues to use Left knee more than right for hip strengthening)    SAQ with large bolster under knee, 2x10 (cues to not fully hyperextends her knee due to increase knee pain with this  exercise)   SLR with leg in neutral position, 2x10 (cues for ankle DF, activate quads and lift leg up towards ceiling)   LAQ LLE only, 2x10 (cues for only knee extension, not hip hike)   Standing heel raises with bilateral UE supports, 2x10  Standing marches with bilateral UE supports, 2x10 (cues for hip flexion instead of knee flexion doing a hamstring curls)       manual therapy techniques: Joint mobilizations, Myofacial release, and Soft tissue Mobilization were applied to the: left knee for 0 minutes, including:  Not today    neuromuscular re-education activities to improve: Balance, Coordination, and Proprioception for 0 minutes. The following activities were included:  Not today      supervised modalities after being cleared for contradictions: IFC Electrical Stimulation:  Ambri received IFC Electrical Stimulation for pain control applied to the knee. Pt received stimulation at 0 % scan at a frequency of 0 for 0 minutes. Ambri tolderated treatment well without any adverse effects.      hot pack for 0 minutes to knee.    cold pack for 0 minutes to knee.      Patient Education and Home Exercises     Home Exercises Provided and Patient Education Provided     Written Home Exercises Provided: yes. Exercises were reviewed and Ambri was able to demonstrate them prior to the end of the session.  Ambri demonstrated good  understanding of the education provided. See EMR under Patient Instructions for exercises provided during therapy sessions    ASSESSMENT   Limited time in therapy session today due to patient was 15 minutes late to her therapy appointment for 1:45pm. Continue with knee strengthening, stability, and flexibility exercises. Patient responded fairly with mild discomforts to Left knee. Pain location is lateral aspect of left patella with tenderness to palpate. Patient states that sometimes her knee swell up. I educated patient on elevating and ice for no more than 10 minutes to left knee for decrease  swelling and pain at home. Patient voiced understanding. Will continue to progress patient as tolerated.     Alexandria Is progressing towards her goals.   Pt prognosis is Good.     Pt will continue to benefit from skilled outpatient physical therapy to address the deficits listed in the problem list box on initial evaluation, provide pt/family education and to maximize pt's level of independence in the home and community environment.     Pt's spiritual, cultural and educational needs considered and pt agreeable to plan of care and goals.     Anticipated barriers to physical therapy: understanding of current condition, difficulty communicating     Goals:   Short Term Goals: 4 weeks   Patient will be independent with home exercise program for knee strengthening.   Patient will report pain 5/10 during walking for 2 minutes.   Patient will improve impaired lower extremity manual muscle tests to >/= 4/5 to improve dynamic left knee support for functional tasks.  Patient will squat with pain <4/10 in knees.      Long Term Goals: 8 weeks    Patient will improve FOTO score to </= 73% limited to decrease perceived limitation with mobility.   Patient will improve impaired lower extremity manual muscle tests to >/= 4+/5 to improve dynamic left knee support for functional tasks.  Patient will demonstrate proper squatting to pick 10# object from floor without increase in pain     PLAN     Plan of care Certification: 12/23/2022 to 2/24/2023.     Outpatient Physical Therapy 2 times weekly for 8 weeks to include the following interventions: Gait Training, Manual Therapy, Patient Education, Self Care, Therapeutic Activities, and Therapeutic Exercise. Pt may be seen by PTA as part of the rehabilitation team.     Continue with plan of care     Jesenia Mosqueda PTA

## 2023-01-10 ENCOUNTER — CLINICAL SUPPORT (OUTPATIENT)
Dept: REHABILITATION | Facility: HOSPITAL | Age: 33
End: 2023-01-10
Payer: MEDICAID

## 2023-01-10 DIAGNOSIS — M25.562 CHRONIC PAIN OF LEFT KNEE: Primary | ICD-10-CM

## 2023-01-10 DIAGNOSIS — G89.29 CHRONIC PAIN OF LEFT KNEE: Primary | ICD-10-CM

## 2023-01-10 PROCEDURE — 97110 THERAPEUTIC EXERCISES: CPT | Mod: PN,CQ

## 2023-01-10 NOTE — PROGRESS NOTES
[facility]   Physical Therapy Treatment Note     Name: Alexandria Saleh  Clinic Number: [id number]    Therapy Diagnosis:   Encounter Diagnosis   Name Primary?    Chronic pain of left knee Yes     Physician: Farzaneh Mcneil PA-C    Visit Date: 1/10/2023    Physician Orders: PT Eval and Treat   Medical Diagnosis from Referral: M25.562,G89.29 (ICD-10-CM) - Chronic pain of left knee   Evaluation Date: 12/23/2022  Authorization Period Expiration: 2/24/2023  Plan of Care Expiration: 2/24/2023  Progress Note Due: 1/20/2023  Visit # / Visits authorized: 5/10  FOTO: 1/3     Precautions: Standard   PTA Visit #: 3/5     Time In: 1345  Time Out: 1420  Total Billable Time: 35 minutes (30 minutes billable)     SUBJECTIVE     Pt reports: she thinks it might be the weather changes that causes more pain to her Left knee. She did soaked her knee in the tub with epsom salts and she states it helped a lot with reduce pain and swelling.     She was compliant with home exercise program.  Response to previous treatment: no concerns   Functional change: knee pain that limits her ability to participate in daily routines.     Pain: 6/10 at rest   Location: left knee      OBJECTIVE     Objective Measures updated at progress report unless specified.     Treatment     Alexandria received the treatments listed below:      therapeutic exercises to develop strength, endurance, ROM, and flexibility for 30 minutes including:    Recumbent bike for 5 minutes at Level 2 (required multiple pauses on bike due to discomforts and pain)   Quad sets with ball under knee, x10   Supine hip ADD ball squeezes, 2x10  Supine hip ABD Red theraband, 2x10   SAQ with large bolster under knee, 2x10 (cues to not fully hyperextends her knee due to increase knee pain with this exercise)   SLR with leg in neutral position, x10 (required a strap to assist in leg lifts today due to increase knee pain)  LAQ LLE only, 2x10   Standing heel raises with  bilateral UE supports, 2x10  Standing marches with bilateral UE supports, 2x10     MHP x 5 minutes to Left knee for decrease pain and for muscles relaxation.     manual therapy techniques: Joint mobilizations, Myofacial release, and Soft tissue Mobilization were applied to the: left knee for 0 minutes, including:    Not today    neuromuscular re-education activities to improve: Balance, Coordination, and Proprioception for 0 minutes. The following activities were included:    Not today      supervised modalities after being cleared for contradictions: IFC Electrical Stimulation:  Ambri received IFC Electrical Stimulation for pain control applied to the knee. Pt received stimulation at 0 % scan at a frequency of 0 for 0 minutes. Ambri tolderated treatment well without any adverse effects.      hot pack for 0 minutes to knee.    cold pack for 0 minutes to knee.      Patient Education and Home Exercises     Home Exercises Provided and Patient Education Provided     Written Home Exercises Provided: yes. Exercises were reviewed and Ambri was able to demonstrate them prior to the end of the session.  Ambri demonstrated good  understanding of the education provided. See EMR under Patient Instructions for exercises provided during therapy sessions    ASSESSMENT   Patient with increase Left knee pain this afternoon. Patient required AAROM with the help of a strap during SLR exercise due to pain and unable to lift leg up off mat. Patient continue to displayed poor pain tolerance in exercises and performed all exercises at slow pace due to pain. Continued with established exercises for reduce pain, strengthening, and endurance. Patient appears tolerate fairly on stationary bike this visit with multiple stops due to knee discomforts and pain, however, patient was able to complete 5 minutes on the bike. Ended session in MHP to Left knee today for muscles relaxation and decrease pain which patient responded well. Overall, patient  is making slow progress towards therapy goals.     Alexandria Is progressing slowly towards her goals.   Pt prognosis is Good.     Pt will continue to benefit from skilled outpatient physical therapy to address the deficits listed in the problem list box on initial evaluation, provide pt/family education and to maximize pt's level of independence in the home and community environment.     Pt's spiritual, cultural and educational needs considered and pt agreeable to plan of care and goals.     Anticipated barriers to physical therapy: understanding of current condition, difficulty communicating     Goals:   Short Term Goals: 4 weeks   Patient will be independent with home exercise program for knee strengthening.   Patient will report pain 5/10 during walking for 2 minutes.   Patient will improve impaired lower extremity manual muscle tests to >/= 4/5 to improve dynamic left knee support for functional tasks.  Patient will squat with pain <4/10 in knees.      Long Term Goals: 8 weeks    Patient will improve FOTO score to </= 73% limited to decrease perceived limitation with mobility.   Patient will improve impaired lower extremity manual muscle tests to >/= 4+/5 to improve dynamic left knee support for functional tasks.  Patient will demonstrate proper squatting to pick 10# object from floor without increase in pain     PLAN     Plan of care Certification: 12/23/2022 to 2/24/2023.     Outpatient Physical Therapy 2 times weekly for 8 weeks to include the following interventions: Gait Training, Manual Therapy, Patient Education, Self Care, Therapeutic Activities, and Therapeutic Exercise. Pt may be seen by PTA as part of the rehabilitation team.     Continue with plan of care     Jesenia Mosqueda PTA

## 2023-01-12 ENCOUNTER — CLINICAL SUPPORT (OUTPATIENT)
Dept: REHABILITATION | Facility: HOSPITAL | Age: 33
End: 2023-01-12
Attending: PHYSICIAN ASSISTANT
Payer: MEDICAID

## 2023-01-12 DIAGNOSIS — M25.562 CHRONIC PAIN OF LEFT KNEE: Primary | ICD-10-CM

## 2023-01-12 DIAGNOSIS — G89.29 CHRONIC PAIN OF LEFT KNEE: Primary | ICD-10-CM

## 2023-01-12 PROCEDURE — 97110 THERAPEUTIC EXERCISES: CPT | Mod: PN,CQ

## 2023-01-12 NOTE — PROGRESS NOTES
[facility]   Physical Therapy Treatment Note     Name: Alexandria Saleh  Clinic Number: [id number]    Therapy Diagnosis:   Encounter Diagnosis   Name Primary?    Chronic pain of left knee Yes     Physician: Farzaneh Mcneil PA-C    Visit Date: 1/12/2023    Physician Orders: PT Eval and Treat   Medical Diagnosis from Referral: M25.562,G89.29 (ICD-10-CM) - Chronic pain of left knee   Evaluation Date: 12/23/2022  Authorization Period Expiration: 2/24/2023  Plan of Care Expiration: 2/24/2023  Progress Note Due: 1/20/2023 (would like to have Supervising physical therapist re-assess patient next visit if possible with new objective measurements to see what goals are met or not met)   Visit # / Visits authorized: 6/10  FOTO: 1/3     Precautions: Standard   PTA Visit #: 4/5     Time In: 1345  Time Out: 1420  Total Billable Time: 35 minutes    SUBJECTIVE     Pt reports: she wore her knee brace yesterday because it was hurting and uses biofreezes which had helped a lot in reducing her knee pain. Today, patient had no complaints of knee pain and is feeling better.     She was compliant with home exercise program.  Response to previous treatment: no concerns   Functional change: knee pain that limits her ability to participate in daily routines.     Pain: 0/10 at rest   Location: left knee      OBJECTIVE     Objective Measures updated at progress report unless specified.     Treatment     Alexandria received the treatments listed below:      therapeutic exercises to develop strength, endurance, ROM, and flexibility for 35 minutes including:    Recumbent bike for 5 minutes at Level 2 - deferred today   Quad sets with ball under knee, 2x10   Supine hip ADD ball squeezes, 2x10  Supine hip ABD Red theraband, 2x10   SAQ with large bolster under knee, 2x10 (cues to not fully hyperextends her knee due to increase knee pain with this exercise)   SLR with leg in neutral position, x10 (no strap required today  "compared to last visit)   LAQ LLE only, 2x10   Standing heel raises with bilateral UE supports, 2x10  Standing marches with with no UE support while on Airex, 2x10   Treadmill for 5 minutes at speed 1.5mph - ADDED TODAY   Squats and Lift using 10lb weight, (lift from a 6 inch steps, cues for proper biomechanics) - ADDED TODAY      MHP x 0 minutes to Left knee for decrease pain and for muscles relaxation.     manual therapy techniques: Joint mobilizations, Myofacial release, and Soft tissue Mobilization were applied to the: left knee for 0 minutes, including:    Not today    neuromuscular re-education activities to improve: Balance, Coordination, and Proprioception for 0 minutes. The following activities were included:    Not today      supervised modalities after being cleared for contradictions: IFC Electrical Stimulation:  Ambri received IFC Electrical Stimulation for pain control applied to the knee. Pt received stimulation at 0 % scan at a frequency of 0 for 0 minutes. Ambri tolderated treatment well without any adverse effects.      hot pack for 0 minutes to knee.    cold pack for 0 minutes to knee.      Patient Education and Home Exercises     Home Exercises Provided and Patient Education Provided     Written Home Exercises Provided: yes. Exercises were reviewed and Ambri was able to demonstrate them prior to the end of the session.  Ambri demonstrated good  understanding of the education provided. See EMR under Patient Instructions for exercises provided during therapy sessions    ASSESSMENT   Patient still remains very weak in left knee musculatures group and decrease endurance. It appears her knee pain varies each therapy visit. No complaints of knee pain today compared to last visit. Introduced treadmill for walking tolerance and endurance which patient did fairly well. Patient did reports of slight discomforts stating that she feels "pressure" to Left knee while walking on treadmill for 3 minutes, " however, no major pain reported and patient was motivated to reach to 5 minutes on treadmill. Ended session with proper techniques in squatting and lifting. Provided verbal cues and demonstration due to poor body biomechanics. Patient showed fair demonstration back to therapist. Overall, at this time, patient is making steadily progress towards her therapy goals.     Senari Is progressing slowly towards her goals.   Pt prognosis is Good.     Pt will continue to benefit from skilled outpatient physical therapy to address the deficits listed in the problem list box on initial evaluation, provide pt/family education and to maximize pt's level of independence in the home and community environment.     Pt's spiritual, cultural and educational needs considered and pt agreeable to plan of care and goals.     Anticipated barriers to physical therapy: understanding of current condition, difficulty communicating     Goals:   Short Term Goals: 4 weeks   Patient will be independent with home exercise program for knee strengthening.   Patient will report pain 5/10 during walking for 2 minutes.   Patient will improve impaired lower extremity manual muscle tests to >/= 4/5 to improve dynamic left knee support for functional tasks.  Patient will squat with pain <4/10 in knees.      Long Term Goals: 8 weeks    Patient will improve FOTO score to </= 73% limited to decrease perceived limitation with mobility.   Patient will improve impaired lower extremity manual muscle tests to >/= 4+/5 to improve dynamic left knee support for functional tasks.  Patient will demonstrate proper squatting to pick 10# object from floor without increase in pain     PLAN     Plan of care Certification: 12/23/2022 to 2/24/2023.     Outpatient Physical Therapy 2 times weekly for 8 weeks to include the following interventions: Gait Training, Manual Therapy, Patient Education, Self Care, Therapeutic Activities, and Therapeutic Exercise. Pt may be seen by PTA  as part of the rehabilitation team.     Continue with plan of care     Jesenia Mosqueda PTA

## 2023-01-23 ENCOUNTER — DOCUMENTATION ONLY (OUTPATIENT)
Dept: REHABILITATION | Facility: HOSPITAL | Age: 33
End: 2023-01-23

## 2023-01-23 NOTE — PROGRESS NOTES
[facility]                                 Physical Therapy       Liner, Alexandria Livingston  MRN: [id number]    Patient canceled today's therapy appointment on 1/23/2023 for 1:45pm.     Jesenia Mosqueda PTA  1/23/2023

## 2023-01-26 ENCOUNTER — CLINICAL SUPPORT (OUTPATIENT)
Dept: REHABILITATION | Facility: HOSPITAL | Age: 33
End: 2023-01-26
Attending: NURSE PRACTITIONER
Payer: MEDICAID

## 2023-01-26 DIAGNOSIS — M25.562 CHRONIC PAIN OF LEFT KNEE: Primary | ICD-10-CM

## 2023-01-26 DIAGNOSIS — G89.29 CHRONIC PAIN OF LEFT KNEE: Primary | ICD-10-CM

## 2023-01-26 PROCEDURE — 97110 THERAPEUTIC EXERCISES: CPT | Mod: PN

## 2023-01-26 NOTE — PROGRESS NOTES
[facility]   Physical Therapy Treatment Note     Name: Alexandria Saleh  Clinic Number: [id number]    Therapy Diagnosis:   Encounter Diagnosis   Name Primary?    Chronic pain of left knee Yes     Physician: No ref. provider found    Visit Date: 1/26/2023    Physician Orders: PT Eval and Treat   Medical Diagnosis from Referral: M25.562,G89.29 (ICD-10-CM) - Chronic pain of left knee   Evaluation Date: 12/23/2022  Authorization Period Expiration: 2/24/2023  Plan of Care Expiration: 2/24/2023  Progress Note Due: 2/24/2023   Visit # / Visits authorized: 7/20  FOTO: 2/3     Precautions: Standard   PTA Visit #: 4/5     Time In: 10:45am  Time Out: 11:20am  Total Billable Time: 40 minutes    SUBJECTIVE     Pt reports: no pain today. She states that she is 7 weeks pregnant and has been experiencing morning sickness.  In the past 2 weeks, patient states she has had no pain, and she also notices that she can perform activities of daily living with no increase in pain.  However, by the end of the day, patient reports increased pain when resting. Patient states she is 50% recovered and would like to continue physical therapy sessions to get back to prior level of function with no pain.    She was compliant with home exercise program.  Response to previous treatment: no concerns   Functional change: knee pain that limits her ability to participate in daily routines.     Pain: 0/10 at rest   Location: left knee      OBJECTIVE     Observation:  Gait: Patient ambulates independently with no use of assistive device.  Gait deviation: no limping, normal gait kinematics     Posture: equal weightbearing, mild genu valgus        Lower Extremity Strength  Right LE   Left LE     Knee extension: 5/5 Knee extension: 5/5*   Knee flexion: 5/5 Knee flexion: 4/5*   Hip flexion: 5/5 Hip flexion: 5/5*   Hip extension:  5/5 Hip extension: 5/5*   Hip abduction: 5/5 Hip abduction: 5/5*   Hip adduction: 5/5 Hip adduction 5/5*    Ankle dorsiflexion: 5/5 Ankle dorsiflexion: 5/5   Ankle plantarflexion: 5/5 Ankle plantarflexion: 5/5     * = pain reported    Function:  - SLS R: 10 sec  - SLS L: >30 sec  - Squat: increase genu valgus, no pain reported    Joint Mobility: significantly limited  Patellar mobility      Palpation: inconsistnet tenderness noted throught left knee medial and lateral      Sensation: reports dimished light touch throughout     Flexibility: mild hamstring tightness bilaterally               Nasim's test: R = - ; L = -    Treatment     Ambri received the treatments listed below:      therapeutic exercises to develop strength, endurance, ROM, and flexibility for 40 minutes including:    Recumbent bike for 5 minutes at Level 2 - deferred today   Supine hip ADD ball squeezes, 2x10  Supine hip ABD Green theraband, 2x10 (cueing to perform slow and control eccentric movement)   SAQ with large bolster under knee, 2x10   SLR with leg in neutral position, 2x10   LAQ LLE only with 2# 2x10   Standing heel raises with bilateral UE supports, 2x10  Standing marches with with no UE support while on Airex, 2x10 (reports increased nausea)  Treadmill for 5 minutes at speed 1.5mph - deferred today   Squats and Lift using 10lb weight, (lift from a 6 inch steps, cues for proper biomechanics) - deferred today     MHP x 0 minutes to Left knee for decrease pain and for muscles relaxation.     manual therapy techniques: Joint mobilizations, Myofacial release, and Soft tissue Mobilization were applied to the: left knee for 0 minutes, including:    Not today    neuromuscular re-education activities to improve: Balance, Coordination, and Proprioception for 0 minutes. The following activities were included:    Not today      supervised modalities after being cleared for contradictions: IFC Electrical Stimulation:  Ambri received IFC Electrical Stimulation for pain control applied to the knee. Pt received stimulation at 0 % scan at a frequency of 0 for  0 minutes. Ambri tolderated treatment well without any adverse effects.      hot pack for 0 minutes to knee.    cold pack for 0 minutes to knee.      Patient Education and Home Exercises     Home Exercises Provided and Patient Education Provided     Written Home Exercises Provided: yes. Exercises were reviewed and Senari was able to demonstrate them prior to the end of the session.  Ambri demonstrated good  understanding of the education provided. See EMR under Patient Instructions for exercises provided during therapy sessions      Assessment     Patient with a medical diagnosis chronic left knee pain is displays improvements with pain intensity, strength, and balance. However, patient continues to display limitations with hamstring strength, balance and coordination, and increased pain after perform activities of daily living at the end of the day.  Patient is able to ambulate on the treadmill and perform squats with no increase in pain.  Continued with established exercises improve lower extremity strength and functional activity endurance. Patient tolerated progress of exercises well with no increase in pain.  Patient displayed increased nausea during standing marches that decreased when taking a rest.  Patient Is progressing slowly towards her goals, meeting 7 out of 8 goals.  Patient will continue to benefit from skilled outpatient physical therapy to address the deficits listed above to return to prior level of function.       Senari Is progressing slowly towards her goals.   Pt prognosis is Good.     Pt will continue to benefit from skilled outpatient physical therapy to address the deficits listed in the problem list box on initial evaluation, provide pt/family education and to maximize pt's level of independence in the home and community environment.     Pt's spiritual, cultural and educational needs considered and pt agreeable to plan of care and goals.     Anticipated barriers to physical therapy:  understanding of current condition, difficulty communicating     Goals:   Short Term Goals: 4 weeks   Patient will be independent with home exercise program for knee strengthening. Goal met 1/26/2023  Patient will report pain 5/10 during walking for 2 minutes. Goal met 1/26/2023  Patient will improve impaired lower extremity manual muscle tests to >/= 4/5 to improve dynamic left knee support for functional tasks. Goal met 1/26/2023  Patient will squat with pain <4/10 in knees. Goal met 1/26/2023     Long Term Goals: 8 weeks    Patient will improve FOTO score to </= 73% limited to decrease perceived limitation with mobility. Goal met 1/26/2023  Patient will improve impaired lower extremity manual muscle tests to >/= 4+/5 to improve dynamic left knee support for functional tasks. Goal progressing 1/26/2023  Patient will demonstrate proper squatting to pick 10# object from floor without increase in pain Goal met 1/26/2023    PLAN     Plan of care Certification: 12/23/2022 to 2/24/2023.     Outpatient Physical Therapy 2 times weekly for 8 weeks to include the following interventions: Gait Training, Manual Therapy, Patient Education, Self Care, Therapeutic Activities, and Therapeutic Exercise. Pt may be seen by PTA as part of the rehabilitation team.     Continue with plan of care     Natalie Mckenzie, PT

## 2023-01-31 ENCOUNTER — CLINICAL SUPPORT (OUTPATIENT)
Dept: REHABILITATION | Facility: HOSPITAL | Age: 33
End: 2023-01-31
Attending: PHYSICIAN ASSISTANT
Payer: MEDICAID

## 2023-01-31 DIAGNOSIS — M25.562 CHRONIC PAIN OF LEFT KNEE: Primary | ICD-10-CM

## 2023-01-31 DIAGNOSIS — G89.29 CHRONIC PAIN OF LEFT KNEE: Primary | ICD-10-CM

## 2023-01-31 PROCEDURE — 97110 THERAPEUTIC EXERCISES: CPT | Mod: PN,CQ

## 2023-01-31 NOTE — PROGRESS NOTES
[facility]   Physical Therapy Treatment Note     Name: Alexandria Saleh  Clinic Number: [id number]    Therapy Diagnosis:   Encounter Diagnosis   Name Primary?    Chronic pain of left knee Yes     Physician: Farzaneh Mcneil PA-C    Visit Date: 1/31/2023    Physician Orders: PT Eval and Treat   Medical Diagnosis from Referral: M25.562,G89.29 (ICD-10-CM) - Chronic pain of left knee   Evaluation Date: 12/23/2022  Authorization Period Expiration: 2/24/2023  Plan of Care Expiration: 2/24/2023  Progress Note Due: 2/24/2023   Visit # / Visits authorized: 5/20  FOTO: 2/3     Precautions: Standard   PTA Visit #: 1/5     Time In: 1350  Time Out: 1423  Total Billable Time: 38 minutes    SUBJECTIVE     Pt reports: has no complaints of knee pain today and denies any recent falls as of late. Patient states she is currently 8 weeks pregnant today.     She was compliant with home exercise program.  Response to previous treatment: no concerns   Functional change: knee pain that limits her ability to participate in daily routines.     Pain: 0/10 at rest   Location: left knee      OBJECTIVE     Tests and measurements were taken on 1/26/2023.     Treatment     *Modified and adjust exercises as appropriate due to patient is currently pregnant.*    Alexandria received the treatments listed below:      therapeutic exercises to develop strength, endurance, ROM, and flexibility for 38 minutes including:    Supine hip ADD ball squeezes, 2x10  SLR with leg in neutral position, x10   LAQ LLE only with 2# 2x10   Seated marches with 2#, 2x10  Standing heel raises with bilateral UE supports, 2x10 (pt feels heartburn and nauseous)  Standing hip abduction bilateral, 2x10 (pt feels heartburn and nauseous)  Alternate step taps on 4 inch, 2x10 (pt feels heartburn and nauseous)  Step ups on 4 inch, 2x10 (pt feels heartburn and nauseous)   Squats and Lift using 10lb weight, x 5    manual therapy techniques: Joint mobilizations,  Myofacial release, and Soft tissue Mobilization were applied to the: left knee for 0 minutes, including:    Not today    neuromuscular re-education activities to improve: Balance, Coordination, and Proprioception for 0 minutes. The following activities were included:    Not today    supervised modalities after being cleared for contradictions: IFC Electrical Stimulation:  Ambri received IFC Electrical Stimulation for pain control applied to the knee. Pt received stimulation at 0 % scan at a frequency of 0 for 0 minutes. Ambri tolderated treatment well without any adverse effects.      hot pack for 0 minutes to knee.    cold pack for 0 minutes to knee.      Patient Education and Home Exercises     Home Exercises Provided and Patient Education Provided     Written Home Exercises Provided: yes. Exercises were reviewed and Ambri was able to demonstrate them prior to the end of the session.  Ambri demonstrated good  understanding of the education provided. See EMR under Patient Instructions for exercises provided during therapy sessions      Assessment   Patient recently found out that she is pregnant on the last therapy visit. Patient is currently 8 weeks pregnant, therefore, modified all exercises as appropriate for patient. Patient did reports of nausea and heartburn throughout and required frequent rest breaks due to 1st trimester symptoms. No complaints of increase Left knee during therapy today. Patient is making steadily progress regarding her knee strengthening and stability, however, may be limited to certain exercises due to pregnancy symptoms. Will monitor patient and progress as appropriate.      Senari Is progressing towards her goals.   Pt prognosis is Good.     Pt will continue to benefit from skilled outpatient physical therapy to address the deficits listed in the problem list box on initial evaluation, provide pt/family education and to maximize pt's level of independence in the home and community  environment.     Pt's spiritual, cultural and educational needs considered and pt agreeable to plan of care and goals.     Anticipated barriers to physical therapy: understanding of current condition, difficulty communicating     Goals:   Short Term Goals: 4 weeks   Patient will be independent with home exercise program for knee strengthening. Goal met 1/26/2023  Patient will report pain 5/10 during walking for 2 minutes. Goal met 1/26/2023  Patient will improve impaired lower extremity manual muscle tests to >/= 4/5 to improve dynamic left knee support for functional tasks. Goal met 1/26/2023  Patient will squat with pain <4/10 in knees. Goal met 1/26/2023     Long Term Goals: 8 weeks    Patient will improve FOTO score to </= 73% limited to decrease perceived limitation with mobility. Goal met 1/26/2023  Patient will improve impaired lower extremity manual muscle tests to >/= 4+/5 to improve dynamic left knee support for functional tasks. Goal progressing 1/26/2023  Patient will demonstrate proper squatting to pick 10# object from floor without increase in pain Goal met 1/26/2023    PLAN   Plan of care Certification: 12/23/2022 to 2/24/2023.     Outpatient Physical Therapy 2 times weekly for 8 weeks to include the following interventions: Gait Training, Manual Therapy, Patient Education, Self Care, Therapeutic Activities, and Therapeutic Exercise. Pt may be seen by PTA as part of the rehabilitation team.     Continue with plan of care. *Modified and adjust exercises as appropriate due to patient is currently pregnant.*     Jesenia Mosqueda PTA

## 2023-02-02 ENCOUNTER — CLINICAL SUPPORT (OUTPATIENT)
Dept: REHABILITATION | Facility: HOSPITAL | Age: 33
End: 2023-02-02
Payer: MEDICAID

## 2023-02-02 DIAGNOSIS — G89.29 CHRONIC PAIN OF LEFT KNEE: Primary | ICD-10-CM

## 2023-02-02 DIAGNOSIS — M25.562 CHRONIC PAIN OF LEFT KNEE: Primary | ICD-10-CM

## 2023-02-02 PROCEDURE — 97110 THERAPEUTIC EXERCISES: CPT | Mod: PN,CQ

## 2023-02-02 NOTE — PROGRESS NOTES
[facility]   Physical Therapy Treatment Note     Name: Alexandria Saleh  Clinic Number: [id number]    Therapy Diagnosis:   Encounter Diagnosis   Name Primary?    Chronic pain of left knee Yes     Physician: Farzaneh Mcneil PA-C    Visit Date: 2/2/2023    Physician Orders: PT Eval and Treat   Medical Diagnosis from Referral: M25.562,G89.29 (ICD-10-CM) - Chronic pain of left knee   Evaluation Date: 12/23/2022  Authorization Period Expiration: 2/24/2023  Plan of Care Expiration: 2/24/2023  Progress Note Due: 2/24/2023   Visit # / Visits authorized: 6/20  FOTO: 2/3     Precautions: Standard   PTA Visit #: 2/5     Time In: 1345  Time Out: 1425  Total Billable Time: 40 minutes    SUBJECTIVE     Pt reports: she's feeling a little better today regarding the pregnancy symptoms (feeling less nauseous). Although patient states her Left hip has been hurting a bit with shooting pain and unsure what causes this symptoms. Other than that, her Left knee feels ok today.     She was compliant with home exercise program.  Response to previous treatment: no concerns   Functional change: knee pain that limits her ability to participate in daily routines.     Pain: 1-2/10 at rest   Location: left knee      OBJECTIVE     Tests and measurements were taken on 1/26/2023.     Treatment     *Modified and adjust exercises as appropriate due to patient is currently pregnant.*    Alexandria received the treatments listed below:      therapeutic exercises to develop strength, endurance, ROM, and flexibility for 40 minutes including:    Seated hip ADD ball squeezes, 3x10  LAQ LLE only with 2# 2x10   Seated marches with 2#, 2x10  Seated hip ABD with GTB, 3x10   Seated hamstrings curls with GTB, 2x10  Standing heel raises with bilateral UE supports, 2x10   Standing hip abduction bilateral, 2x10   Alternate step taps on 6 inch, 2x10  Step ups on 6 inch, 2x10   Lateral step ups on 6 inch, 2x10 - added today  Squats and Lift using  10lb weight, x 5  Lateral side steps with GTB at ankles, 2 laps within 10ft - added today  Recumbent bike for 5 minutes at Level 1    manual therapy techniques: Joint mobilizations, Myofacial release, and Soft tissue Mobilization were applied to the: left knee for 0 minutes, including:    Not today    neuromuscular re-education activities to improve: Balance, Coordination, and Proprioception for 0 minutes. The following activities were included:    Not today    supervised modalities after being cleared for contradictions: IFC Electrical Stimulation:  Ambri received IFC Electrical Stimulation for pain control applied to the knee. Pt received stimulation at 0 % scan at a frequency of 0 for 0 minutes. Ambri tolderated treatment well without any adverse effects.      hot pack for 0 minutes to knee.    cold pack for 0 minutes to knee.      Patient Education and Home Exercises     Home Exercises Provided and Patient Education Provided     Written Home Exercises Provided: yes. Exercises were reviewed and Alexandria was able to demonstrate them prior to the end of the session.  Senari demonstrated good  understanding of the education provided. See EMR under Patient Instructions for exercises provided during therapy sessions      Assessment   Patient tolerated well with all exercises for Left knee strengthening and flexibility today. No complaints of increase knee pain or increase nausea throughout session. Continued to provide verbal cues for proper techniques in squats and lift exercise with wide base of support and keep center of gravity low. Overall, Alexandria Is progressing towards her goals.   Pt prognosis is Good.     Pt will continue to benefit from skilled outpatient physical therapy to address the deficits listed in the problem list box on initial evaluation, provide pt/family education and to maximize pt's level of independence in the home and community environment.     Pt's spiritual, cultural and educational needs  considered and pt agreeable to plan of care and goals.     Anticipated barriers to physical therapy: understanding of current condition, difficulty communicating     Goals:   Short Term Goals: 4 weeks   Patient will be independent with home exercise program for knee strengthening. Goal met 1/26/2023  Patient will report pain 5/10 during walking for 2 minutes. Goal met 1/26/2023  Patient will improve impaired lower extremity manual muscle tests to >/= 4/5 to improve dynamic left knee support for functional tasks. Goal met 1/26/2023  Patient will squat with pain <4/10 in knees. Goal met 1/26/2023     Long Term Goals: 8 weeks    Patient will improve FOTO score to </= 73% limited to decrease perceived limitation with mobility. Goal met 1/26/2023  Patient will improve impaired lower extremity manual muscle tests to >/= 4+/5 to improve dynamic left knee support for functional tasks. Goal progressing 1/26/2023  Patient will demonstrate proper squatting to pick 10# object from floor without increase in pain Goal met 1/26/2023    PLAN   Plan of care Certification: 12/23/2022 to 2/24/2023.     Outpatient Physical Therapy 2 times weekly for 8 weeks to include the following interventions: Gait Training, Manual Therapy, Patient Education, Self Care, Therapeutic Activities, and Therapeutic Exercise. Pt may be seen by PTA as part of the rehabilitation team.     Continue with plan of care. *Modified and adjust exercises as appropriate due to patient is currently pregnant.*     Jesenia Mosqueda PTA

## 2023-02-07 ENCOUNTER — DOCUMENTATION ONLY (OUTPATIENT)
Dept: REHABILITATION | Facility: HOSPITAL | Age: 33
End: 2023-02-07
Payer: MEDICAID

## 2023-02-07 NOTE — PROGRESS NOTES
[facility]                                 Physical Therapy       Liner, Alexandria Livingston  MRN: [id number]    Patient canceled today's therapy appointment on 2/7/2023 for 1:45pm due to appt time no longer works.     Jesenia Mosqueda, PTA  2/7/2023

## 2023-02-09 ENCOUNTER — CLINICAL SUPPORT (OUTPATIENT)
Dept: REHABILITATION | Facility: HOSPITAL | Age: 33
End: 2023-02-09
Payer: MEDICAID

## 2023-02-09 DIAGNOSIS — G89.29 CHRONIC PAIN OF LEFT KNEE: Primary | ICD-10-CM

## 2023-02-09 DIAGNOSIS — M25.562 CHRONIC PAIN OF LEFT KNEE: Primary | ICD-10-CM

## 2023-02-09 PROCEDURE — 97110 THERAPEUTIC EXERCISES: CPT | Mod: PN,CQ

## 2023-02-09 NOTE — PROGRESS NOTES
[facility]   Physical Therapy Treatment Note     Name: Alexandria Saleh  Clinic Number: [id number]    Therapy Diagnosis:   Encounter Diagnosis   Name Primary?    Chronic pain of left knee Yes     Physician: Farzaneh Mcneil PA-C    Visit Date: 2/9/2023    Physician Orders: PT Eval and Treat   Medical Diagnosis from Referral: M25.562,G89.29 (ICD-10-CM) - Chronic pain of left knee   Evaluation Date: 12/23/2022  Authorization Period Expiration: 2/24/2023  Plan of Care Expiration: 2/24/2023  Progress Note Due: 2/24/2023   Visit # / Visits authorized: 7/20  FOTO: 2/3     Precautions: Standard   PTA Visit #: 3/5     Time In: 1350  Time Out: 1430  Total Billable Time: 40 minutes    SUBJECTIVE     Pt reports: of no pain or discomforts to left knee as of late. She has no difficulties getting in/out of car or going up/down steps.     She was compliant with home exercise program.  Response to previous treatment: no concerns   Functional change: improved left knee pain.     Pain: 0/10 at rest   Location: left knee      OBJECTIVE     Tests and measurements were taken on 1/26/2023.     Treatment     *Modified and adjust exercises as appropriate due to patient is currently pregnant.*    Alexandria received the treatments listed below:      therapeutic exercises to develop strength, endurance, ROM, and flexibility for 40 minutes including:    Seated hip ADD ball squeezes, 3x10  LAQ LLE only with 2# 2x10   Seated marches with 2# bilateral, 2x10  Seated hip ABD with GTB, 3x10   Seated hamstrings curls with GTB, 2x10  Standing heel raises with bilateral UE supports, 2x10   Standing hip abduction bilateral, 2x10   Alternate step taps on 6 inch, 2x10  Step ups on 6 inch, 2x10   Lateral step ups on 6 inch, x10   Squats and Lift using 10lb weight, x 5  Lateral side steps with GTB at ankles, 2 laps within 10ft   Recumbent bike for 5 minutes at Level 1    manual therapy techniques: Joint mobilizations, Myofacial  release, and Soft tissue Mobilization were applied to the: left knee for 0 minutes, including:    Not today    neuromuscular re-education activities to improve: Balance, Coordination, and Proprioception for 0 minutes. The following activities were included:    Not today    supervised modalities after being cleared for contradictions: IFC Electrical Stimulation:  Senari received IFC Electrical Stimulation for pain control applied to the knee. Pt received stimulation at 0 % scan at a frequency of 0 for 0 minutes. Ambri tolderated treatment well without any adverse effects.      hot pack for 0 minutes to knee.    cold pack for 0 minutes to knee.      Patient Education and Home Exercises     Home Exercises Provided and Patient Education Provided     Written Home Exercises Provided: yes. Exercises were reviewed and Senari was able to demonstrate them prior to the end of the session.  Ambri demonstrated good  understanding of the education provided. See EMR under Patient Instructions for exercises provided during therapy sessions      Assessment   Patient appears to have reached her full potienal in therapy for Left knee. Patient has shown much improvement in overall knee strengthening, range of motions, and stability. Currently, patient states she has no difficulties with activities of daily living. No concerns of knee pain as of late. Therefore, will consider D/C on the next upcoming visit and allow patient to fully focus on her pregnancy.     Alexandria Is progressing towards her goals.   Pt prognosis is Good.     Pt will continue to benefit from skilled outpatient physical therapy to address the deficits listed in the problem list box on initial evaluation, provide pt/family education and to maximize pt's level of independence in the home and community environment.     Pt's spiritual, cultural and educational needs considered and pt agreeable to plan of care and goals.     Anticipated barriers to physical therapy:  understanding of current condition, difficulty communicating     Goals:   Short Term Goals: 4 weeks   Patient will be independent with home exercise program for knee strengthening. Goal met 1/26/2023  Patient will report pain 5/10 during walking for 2 minutes. Goal met 1/26/2023  Patient will improve impaired lower extremity manual muscle tests to >/= 4/5 to improve dynamic left knee support for functional tasks. Goal met 1/26/2023  Patient will squat with pain <4/10 in knees. Goal met 1/26/2023     Long Term Goals: 8 weeks    Patient will improve FOTO score to </= 73% limited to decrease perceived limitation with mobility. Goal met 1/26/2023  Patient will improve impaired lower extremity manual muscle tests to >/= 4+/5 to improve dynamic left knee support for functional tasks. Goal progressing 1/26/2023  Patient will demonstrate proper squatting to pick 10# object from floor without increase in pain Goal met 1/26/2023    PLAN   Plan of care Certification: 12/23/2022 to 2/24/2023.     Outpatient Physical Therapy 2 times weekly for 8 weeks to include the following interventions: Gait Training, Manual Therapy, Patient Education, Self Care, Therapeutic Activities, and Therapeutic Exercise. Pt may be seen by PTA as part of the rehabilitation team.     Continue with plan of care. *Modified and adjust exercises as appropriate due to patient is currently pregnant.*     D/C patient next visit!     Jesenia Mosqueda PTA

## 2023-02-14 ENCOUNTER — CLINICAL SUPPORT (OUTPATIENT)
Dept: REHABILITATION | Facility: HOSPITAL | Age: 33
End: 2023-02-14
Attending: PHYSICIAN ASSISTANT
Payer: MEDICAID

## 2023-02-14 DIAGNOSIS — M25.562 CHRONIC PAIN OF LEFT KNEE: Primary | ICD-10-CM

## 2023-02-14 DIAGNOSIS — G89.29 CHRONIC PAIN OF LEFT KNEE: Primary | ICD-10-CM

## 2023-02-14 PROCEDURE — 97110 THERAPEUTIC EXERCISES: CPT | Mod: PN

## 2023-02-14 NOTE — PROGRESS NOTES
[facility]  Physical Therapy Discharge Note    Name: Alexandria Saleh  Clinic Number: [id number]    Therapy Diagnosis:   Encounter Diagnosis   Name Primary?    Chronic pain of left knee Yes     Physician: Farzaneh Mcneil PA-C    Physician Orders: eval and treat   Medical Diagnosis: M25.562,G89.29 (ICD-10-CM) - Chronic pain of left knee  Evaluation Date: 12/23/2022      Date of Last visit: 2/14/2023  Total Visits Received: 8    ASSESSMENT      Observation: no limping, normal gait kinematics  Reporting no pain in her knees      Posture: equal weightbearing, mild genu valgus        Range of Motion:   Knee Left active Left Passive Right Active R passive   Flexion 130 130 130 130   Extension 0 0 0 0               Lower Extremity Strength  **Difficult time assessing, patient had a hard time following directions for manual muscle testing, based on observation and from resistance given throughout exercises.   Right LE   Left LE     Knee extension: 4+/5 Knee extension: 4+/5   Knee flexion: 4/5 Knee flexion: 4/5   Hip flexion: 4+/5 Hip flexion: 4+/5   Hip extension:  4+/5 Hip extension: 4+/5   Hip abduction: 4+/5 Hip abduction: 4+/5   Hip adduction: 5/5 Hip adduction 5/5   Ankle dorsiflexion: 5/5 Ankle dorsiflexion: 5/5   Ankle plantarflexion: 5/5 Ankle plantarflexion: 5/5               Special Tests:    Left Right   Valgus Stress Test - -   Varus Stress test - -   Lachman's test - -   Posterior Lachman - -   Laura's Test - -         Function:     - SLS R: 30 sec  - SLS L: 30 sec   - Squat: improved form and good tracking noted   - Sit <--> Stand: Within Normal Limits    - Bed Mobility: Within Normal Limits            Joint Mobility: moderately improved   Patellar mobility      Palpation: no tenderness      Sensation: reports intact      Flexibility: mild hamstring tightness bilaterally               Nasim's test: R = - ; L = -        Edema: none present    Discharge reason: Patient has met all  goals, and due to upcoming pregnancy     Discharge FOTO Score: 91%    Goals: Short Term Goals: 4 weeks   Patient will be independent with home exercise program for knee strengthening. GOAL MET  Patient will report pain 5/10 during walking for 2 minutes. GOAL MET  Patient will improve impaired lower extremity manual muscle tests to >/= 4/5 to improve dynamic left knee support for functional tasks.  Patient will squat with pain <4/10 in knees. GOAL MET      Long Term Goals: 8 weeks    Patient will improve FOTO score to </= 73% limited to decrease perceived limitation with mobility. GOAL MET   Patient will improve impaired lower extremity manual muscle tests to >/= 4+/5 to improve dynamic left knee support for functional tasks. GOAL MET  Patient will demonstrate proper squatting to pick 10# object from floor without increase in pain GOAL MET     PLAN   This patient is discharged from Physical Therapy      Brittany De Luna, PT , NCS

## 2023-07-19 ENCOUNTER — OCCUPATIONAL HEALTH (OUTPATIENT)
Dept: URGENT CARE | Facility: CLINIC | Age: 33
End: 2023-07-19

## 2023-07-19 DIAGNOSIS — Z02.83 ENCOUNTER FOR DRUG SCREENING: Primary | ICD-10-CM

## 2023-07-19 PROCEDURE — 80305 MEDTOX HAIR COLLECTION ONLY: ICD-10-PCS | Mod: S$GLB,,, | Performed by: NURSE PRACTITIONER

## 2023-07-19 PROCEDURE — 80305 DRUG TEST PRSMV DIR OPT OBS: CPT | Mod: S$GLB,,, | Performed by: NURSE PRACTITIONER

## 2023-07-19 NOTE — PROGRESS NOTES
Patient presented to clinic for a Random DCFS drug screen. The following were collected Hair and Urine.

## 2023-07-25 ENCOUNTER — OCCUPATIONAL HEALTH (OUTPATIENT)
Dept: URGENT CARE | Facility: CLINIC | Age: 33
End: 2023-07-25

## 2023-07-25 DIAGNOSIS — Z02.83 ENCOUNTER FOR DRUG SCREENING: Primary | ICD-10-CM

## 2023-07-25 PROCEDURE — 80305 MEDTOX HAIR COLLECTION ONLY: ICD-10-PCS | Mod: S$GLB,,, | Performed by: PHYSICIAN ASSISTANT

## 2023-07-25 PROCEDURE — 80305 DRUG TEST PRSMV DIR OPT OBS: CPT | Mod: S$GLB,,, | Performed by: PHYSICIAN ASSISTANT

## 2023-07-25 NOTE — PROGRESS NOTES
Patient presented to clinic for a Random DCFS drug screen. The following were collected Hair and Urine .

## 2023-08-17 PROBLEM — N89.8 VAGINAL DISCHARGE DURING PREGNANCY: Status: ACTIVE | Noted: 2023-08-17

## 2023-08-17 PROBLEM — O26.899 VAGINAL DISCHARGE DURING PREGNANCY: Status: ACTIVE | Noted: 2023-08-17

## 2023-09-01 PROBLEM — O24.419 GESTATIONAL DIABETES MELLITUS (GDM) IN THIRD TRIMESTER: Status: ACTIVE | Noted: 2023-09-01

## 2023-09-01 PROBLEM — F15.959 METHAMPHETAMINE-INDUCED PSYCHOTIC DISORDER: Status: ACTIVE | Noted: 2023-09-01

## 2023-12-15 PROBLEM — F33.2 SEVERE EPISODE OF RECURRENT MAJOR DEPRESSIVE DISORDER, WITHOUT PSYCHOTIC FEATURES: Status: ACTIVE | Noted: 2023-12-15

## 2024-08-29 ENCOUNTER — OCCUPATIONAL HEALTH (OUTPATIENT)
Dept: URGENT CARE | Facility: CLINIC | Age: 34
End: 2024-08-29

## 2024-08-29 DIAGNOSIS — Z02.83 ENCOUNTER FOR DRUG SCREENING: Primary | ICD-10-CM

## 2024-08-29 NOTE — PROGRESS NOTES
Patient presented to clinic for a Random DCFS drug screen. The following were collected Hair and urine. Osorio checked my paperwork.